# Patient Record
Sex: FEMALE | URBAN - METROPOLITAN AREA
[De-identification: names, ages, dates, MRNs, and addresses within clinical notes are randomized per-mention and may not be internally consistent; named-entity substitution may affect disease eponyms.]

---

## 2019-09-24 ENCOUNTER — APPOINTMENT (OUTPATIENT)
Dept: LAB | Facility: CLINIC | Age: 24
End: 2019-09-24
Payer: COMMERCIAL

## 2019-09-24 ENCOUNTER — OFFICE VISIT (OUTPATIENT)
Dept: DERMATOLOGY | Facility: CLINIC | Age: 24
End: 2019-09-24
Payer: COMMERCIAL

## 2019-09-24 DIAGNOSIS — L70.0 ACNE VULGARIS: ICD-10-CM

## 2019-09-24 DIAGNOSIS — Z79.899 ON ISOTRETINOIN THERAPY: Primary | ICD-10-CM

## 2019-09-24 LAB — HCG UR QL: NEGATIVE

## 2019-09-24 RX ORDER — ISOTRETINOIN 20 MG/1
20 CAPSULE ORAL DAILY
COMMUNITY

## 2019-09-24 ASSESSMENT — PAIN SCALES - GENERAL: PAINLEVEL: NO PAIN (0)

## 2019-09-24 NOTE — PROGRESS NOTES
"[facility] Dermatology Note      Dermatology Problem List:  1. Acne Vulgaris  -Current tx: isotretinoin 20mg po every day, [place]. Will plan to restart treatment once she is approved in iPledge.    Encounter Date: Sep 24, 2019    CC:  Chief Complaint   Patient presents with     Derm Problem     Valerie is here today to be seen for acne- Valerie states \"I'm taking accutane, and am transfering care\".          History of Present Illness:  Ms. Valerie Martinez is a 23 year old female who is new to the clinic and presents for acne. At today's visit, the patient notes that she was prescribed isotretinoin in [place], and has been taking 20mg every day for 4 months now. Additionally, she states she has been using desloratadine 5mg qPM. The patient would like to continue isotretinoin treatment. She states she is not sexually active and is not currently on birth control. The patient states the last time she took the medication was 9 days ago and now she is out of pills. She notes that she mainly gets facial acne and notes significant improvement since starting the medication. She states that dryness was a problem during the first two months of treatment, but is now manageable. She is currently using differin gel for dark spots as recommended by her dermatologist in [facility]. The patient reports tolerable mucocutaneous dryness, and denies arthralgias, myalgias, depression, suicidal ideation, diarrhea, headache, or blurred vision.        Past Medical History:   There is no problem list on file for this patient.    No past medical history on file.  No past surgical history on file.    Social History:       Family History:  No family history on file.    Medications:  Current Outpatient Medications   Medication Sig Dispense Refill     ISOtretinoin (ACCUTANE) 20 MG capsule Take 20 mg by mouth daily         No Known Allergies    Review of Systems:  -Neuro: no HA or vision " changes  -GI: No nausea, blood in stool, diarrhea, hx of IBD  -Psych: no depression/anxiety, mood changes, or sleep problems   -Musculoskeletal: no joint or muscle pain or swelling   -Heme/Lymph: no concerning bumps, no bleeding problems  -Constitutional: The patient denies fatigue, fevers, chills, unintended weight loss, and night sweats.  -HEENT: Patient denies nonhealing oral sores.  -Skin: As above in HPI. No additional skin concerns.    Physical exam:  Vitals: Weight 139.4lbs  GEN: This is a well developed, well-nourished female in no acute distress, in a pleasant mood.    SKIN: Acne exam, which includes the face, neck, upper central chest, and upper central back was performed.  -no active papules on the face  -mild hypo and hyperpigmented macules on the cheeks  -no active body acne   -No other lesions of concern on areas examined.       Impression/Plan:  1. Acne vulgaris  Will continue isotretinoin next month. Goal dose is 150-220mg/kg for this 63kg patient. Patient has been on 20mg daily for the past 4mo but has not been followed in iPledge as she [place].  Method of contraception includes: abstinence   Discussion of the risks and side effects of isotretinoin including but not limited to mucocutaneous dryness, arthralgias, myalgias, depression, suicidal ideation, headache, blurred vision, increase in liver function test and increase in lipids. The iPledge program brochure was provided and the contents discussed with the patient. The patient was counseled that they cannot give blood while on isotretinoin. Advised against tattoos and waxing. No personal or family history of inflammatory bowel disease or hypertriglyceridemia known to patient. Reviewed need to avoid alcohol on medication. The iPledge program consent was obtained. Patient counseled that if they wear contacts, the eyes may become too dry to tolerate. Recommend follow up with eye doctor if this occurs.    Reviewed  patient's LFTs, Triglycerides and total cholesterol over the past three months (provided by patient, was checked in [facility]). All were within normal limits except total cholesterol which was 215 at its highest point, but the patient states she was not fasting at the time of this test.  Discussed need for sun protection, at least SPF 30+.  Urine pregnancy test obtained today.  Next month: labs including qualitative hCG, CBC, BUN/Cr, fasting lipids and AST/ALT will be obtained.   Patient's iPledge # is [id number].   Continue using Differin gel for acne scarring. If too drying will discontinue  Patient will lapse in her treatment for 1mo due to iPledge system.  The patient will stop all other acne medications next month.  Total dose: 38.1mg/kg (2400mg)    CC Dr. Grady on close of this encounter.  Follow-up in 1 months, earlier for new or changing lesions.       Staff Involved:  Staff/Scribe    Scribe Disclosure:  KG, Joseph Fernando, am serving as a scribe to document services personally performed by Katrin Westfall PA-C, based on data collection and the provider's statements to me.     Provider Disclosure:   The documentation recorded by the scribe accurately reflects the services I personally performed and the decisions made by me.    All risks, benefits and alternatives were discussed with patient.  Patient is in agreement and understands the assessment and plan.  All questions were answered.    Katrin Westfall PA-C  [facility]: Phone: [phone], Fax: [phone]

## 2019-09-24 NOTE — NURSING NOTE
"Dermatology Rooming Note    Valerie Martinez's goals for this visit include:   Chief Complaint   Patient presents with     Derm Problem     Valerie is here today to be seen for acne- Valerie states \"I'm taking accutane, and am transfering care\".      Annalisa Pastor, RMDYLAN     "

## 2019-09-24 NOTE — LETTER
"9/24/2019       RE: Valerie Martinez  [address] [facility] [address]     Dear Colleague,    Thank you for referring your patient, Valerie Martinez, to the [facility] DERMATOLOGY at [facility]. Please see a copy of my visit note below.    [facility] Dermatology Note      Dermatology Problem List:  1. Acne Vulgaris  -Current tx: isotretinoin 20mg po every day, patient [place] this [place]. Will plan to restart treatment once she is approved in iPledge.    Encounter Date: Sep 24, 2019    CC:  Chief Complaint   Patient presents with     Derm Problem     Valerie is here today to be seen for acne- Valerie states \"I'm taking accutane, and am transfering care\".          History of Present Illness:  Ms. Valerie Martinez is a 23 year old female who is new to the clinic and presents for acne. At today's visit, the patient notes that she was prescribed isotretinoin in [place], and has been taking 20mg every day for 4 months now. Additionally, she states she has been using desloratadine 5mg qPM. The patient would like to continue isotretinoin treatment. She states she is not sexually active and is not currently on birth control. The patient states the last time she took the medication was 9 days ago and now she is out of pills. She notes that she mainly gets facial acne and notes significant improvement since starting the medication. She states that dryness was a problem during the first two months of treatment, but is now manageable. She is currently using differin gel for dark spots as recommended by her dermatologist in [facility]. The patient reports tolerable mucocutaneous dryness, and denies arthralgias, myalgias, depression, suicidal ideation, diarrhea, headache, or blurred vision.        Past Medical History:   There is no problem list on file for this patient.    No past medical history on file.  No past surgical history on file.    Social " History:       Family History:  No family history on file.    Medications:  Current Outpatient Medications   Medication Sig Dispense Refill     ISOtretinoin (ACCUTANE) 20 MG capsule Take 20 mg by mouth daily         No Known Allergies    Review of Systems:  -Neuro: no HA or vision changes  -GI: No nausea, blood in stool, diarrhea, hx of IBD  -Psych: no depression/anxiety, mood changes, or sleep problems   -Musculoskeletal: no joint or muscle pain or swelling   -Heme/Lymph: no concerning bumps, no bleeding problems  -Constitutional: The patient denies fatigue, fevers, chills, unintended weight loss, and night sweats.  -HEENT: Patient denies nonhealing oral sores.  -Skin: As above in HPI. No additional skin concerns.    Physical exam:  Vitals: Weight 139.4lbs  GEN: This is a well developed, well-nourished female in no acute distress, in a pleasant mood.    SKIN: Acne exam, which includes the face, neck, upper central chest, and upper central back was performed.  -no active papules on the face  -mild hypo and hyperpigmented macules on the cheeks  -no active body acne   -No other lesions of concern on areas examined.       Impression/Plan:  1. Acne vulgaris  Will continue isotretinoin next month. Goal dose is 150-220mg/kg for this 63kg patient. Patient has been on 20mg daily for the past 4mo but has not been followed in iPledge as she [place].  Method of contraception includes: abstinence   Discussion of the risks and side effects of isotretinoin including but not limited to mucocutaneous dryness, arthralgias, myalgias, depression, suicidal ideation, headache, blurred vision, increase in liver function test and increase in lipids. The iPledge program brochure was provided and the contents discussed with the patient. The patient was counseled that they cannot give blood while on isotretinoin. Advised against tattoos and waxing. No personal or family history of inflammatory bowel disease or  hypertriglyceridemia known to patient. Reviewed need to avoid alcohol on medication. The iPledge program consent was obtained. Patient counseled that if they wear contacts, the eyes may become too dry to tolerate. Recommend follow up with eye doctor if this occurs.    Reviewed patient's LFTs, Triglycerides and total cholesterol over the past three months (provided by patient, was checked in [facility]). All were within normal limits except total cholesterol which was 215 at its highest point, but the patient states she was not fasting at the time of this test.  Discussed need for sun protection, at least SPF 30+.  Urine pregnancy test obtained today.  Next month: labs including qualitative hCG, CBC, BUN/Cr, fasting lipids and AST/ALT will be obtained.   Patient's iPledge # is [id number].   Continue using Differin gel for acne scarring. If too drying will discontinue  Patient will lapse in her treatment for 1mo due to iPledge system.  The patient will stop all other acne medications next month.  Total dose: 38.1mg/kg (2400mg)    CC Dr. Grady on close of this encounter.  Follow-up in 1 months, earlier for new or changing lesions.       Staff Involved:  Staff/Scribe    Scribe Disclosure:  I, Joseph Fernando, am serving as a scribe to document services personally performed by Katrin Westfall PA-C, based on data collection and the provider's statements to me.     Provider Disclosure:   The documentation recorded by the scribe accurately reflects the services I personally performed and the decisions made by me.    All risks, benefits and alternatives were discussed with patient.  Patient is in agreement and understands the assessment and plan.  All questions were answered.    Katrin Westfall PA-C  [facility]: Phone: [phone], Fax: [phone]

## 2019-10-06 ENCOUNTER — TRANSFERRED RECORDS (OUTPATIENT)
Dept: HEALTH INFORMATION MANAGEMENT | Facility: CLINIC | Age: 24
End: 2019-10-06